# Patient Record
Sex: FEMALE | Race: WHITE | Employment: FULL TIME | URBAN - METROPOLITAN AREA
[De-identification: names, ages, dates, MRNs, and addresses within clinical notes are randomized per-mention and may not be internally consistent; named-entity substitution may affect disease eponyms.]

---

## 2023-12-05 ENCOUNTER — TELEPHONE (OUTPATIENT)
Dept: NEUROLOGY | Age: 43
End: 2023-12-05

## 2024-05-30 ENCOUNTER — HOSPITAL ENCOUNTER (OUTPATIENT)
Dept: MAMMOGRAPHY | Age: 44
Discharge: HOME | End: 2024-05-30
Payer: COMMERCIAL

## 2024-05-30 VITALS — BODY MASS INDEX: 23.49 KG/M2 | HEIGHT: 68 IN | WEIGHT: 155 LBS

## 2024-05-30 DIAGNOSIS — Z12.31 BREAST CANCER SCREENING BY MAMMOGRAM: ICD-10-CM

## 2024-05-30 DIAGNOSIS — E55.9 VITAMIN D DEFICIENCY: ICD-10-CM

## 2024-05-30 LAB — 25(OH)D3 SERPL-MCNC: 23.1 NG/ML (ref 30–100)

## 2024-05-30 PROCEDURE — 77063 BREAST TOMOSYNTHESIS BI: CPT

## 2024-08-12 LAB
CHOLEST SERPL-MCNC: 173 MG/DL (ref 0–200)
GLUCOSE SERPL-MCNC: 90 MG/DL (ref 70–99)
HDLC SERPL-MCNC: 47 MG/DL (ref 40–60)
LDLC SERPL CALC-MCNC: 110 MG/DL (ref 0–100)
TRIGL SERPL-MCNC: 81 MG/DL (ref 0–150)

## 2025-04-02 ENCOUNTER — OFFICE VISIT (OUTPATIENT)
Dept: INTERNAL MEDICINE CLINIC | Facility: CLINIC | Age: 45
End: 2025-04-02
Payer: COMMERCIAL

## 2025-04-02 VITALS
HEIGHT: 68 IN | OXYGEN SATURATION: 96 % | SYSTOLIC BLOOD PRESSURE: 112 MMHG | DIASTOLIC BLOOD PRESSURE: 72 MMHG | BODY MASS INDEX: 22.88 KG/M2 | HEART RATE: 80 BPM | WEIGHT: 151 LBS

## 2025-04-02 DIAGNOSIS — J04.0 REFLUX LARYNGITIS: ICD-10-CM

## 2025-04-02 DIAGNOSIS — K21.9 REFLUX LARYNGITIS: ICD-10-CM

## 2025-04-02 DIAGNOSIS — Z12.11 SCREEN FOR COLON CANCER: ICD-10-CM

## 2025-04-02 DIAGNOSIS — R06.83 SNORING: ICD-10-CM

## 2025-04-02 DIAGNOSIS — R06.5 MOUTH BREATHING: ICD-10-CM

## 2025-04-02 DIAGNOSIS — K21.9 GASTROESOPHAGEAL REFLUX DISEASE, UNSPECIFIED WHETHER ESOPHAGITIS PRESENT: Primary | ICD-10-CM

## 2025-04-02 PROBLEM — N32.81 OAB (OVERACTIVE BLADDER): Status: RESOLVED | Noted: 2022-06-09 | Resolved: 2025-04-02

## 2025-04-02 PROCEDURE — 99214 OFFICE O/P EST MOD 30 MIN: CPT | Performed by: INTERNAL MEDICINE

## 2025-04-02 SDOH — ECONOMIC STABILITY: FOOD INSECURITY: WITHIN THE PAST 12 MONTHS, YOU WORRIED THAT YOUR FOOD WOULD RUN OUT BEFORE YOU GOT MONEY TO BUY MORE.: NEVER TRUE

## 2025-04-02 SDOH — ECONOMIC STABILITY: FOOD INSECURITY: WITHIN THE PAST 12 MONTHS, THE FOOD YOU BOUGHT JUST DIDN'T LAST AND YOU DIDN'T HAVE MONEY TO GET MORE.: NEVER TRUE

## 2025-04-02 ASSESSMENT — ENCOUNTER SYMPTOMS
ABDOMINAL DISTENTION: 1
SHORTNESS OF BREATH: 0
COUGH: 0
NAUSEA: 1
ABDOMINAL PAIN: 1
CHEST TIGHTNESS: 0
CONSTIPATION: 0

## 2025-04-02 ASSESSMENT — PATIENT HEALTH QUESTIONNAIRE - PHQ9
SUM OF ALL RESPONSES TO PHQ QUESTIONS 1-9: 0
2. FEELING DOWN, DEPRESSED OR HOPELESS: NOT AT ALL
1. LITTLE INTEREST OR PLEASURE IN DOING THINGS: NOT AT ALL
SUM OF ALL RESPONSES TO PHQ QUESTIONS 1-9: 0

## 2025-04-02 NOTE — PATIENT INSTRUCTIONS
Records available in care everywhere were reviewed, more than 30 to 40 minutes were spent, reviewing and updating current chart.

## 2025-04-02 NOTE — PROGRESS NOTES
Chief Complaint   Patient presents with    Referral - General        Shyanne Phelps is a 44 y.o. female who presents today for Referral - General     Today she is here to request couple of referrals,  She is currently working at the OR with [employer], reports significant stressors, which she believes has been increasing her gastrointestinal symptoms, she has been seen by ENT who found her she had laryngeal reflux, she has been experiencing more of the acid reflux, nausea, abdominal discomfort, she has been on PPI in the past, but reports side effects, so she is currently taking GI Mix Gourmet that was recommended by her provider, she has been trying to avoid possible triggers, but despite of this she has been feeling symptoms are not getting better,  She is going to be due for screening colonoscopy this year, so she will like to establish with GI    She also is concerned about snoring, she is a mouth breather, she feels tired during the day, would like to have a sleep test,        Wt Readings from Last 3 Encounters:   04/02/25 68.5 kg (151 lb)   05/30/24 70.3 kg (155 lb)   10/24/23 68.9 kg (152 lb)     Vitals:    04/02/25 1227   BP: 112/72   BP Site: Left Upper Arm   Patient Position: Sitting   Pulse: 80   SpO2: 96%   Weight: 68.5 kg (151 lb)   Height: 1.727 m (5' 8\")        Assessment and plan:  1. Gastroesophageal reflux disease, unspecified whether esophagitis present  -     External Referral To Gastroenterology  2. Screen for colon cancer  -     External Referral To Gastroenterology  3. Reflux laryngitis  -     External Referral To Gastroenterology  4. Mouth breathing  -     [facility] [place] 2[place] Lab  5. Snoring  -     [facility] [place]note 2[place] Sleep Lab  Referrals for gastroenterology will be placed for possible endoscopy and colonoscopy to rule out Helicobacter pylori, hiatal hernias, esophagitis, among other possible diagnoses, she will continue with natural products, may try

## 2025-04-03 LAB
CHOLEST SERPL-MCNC: 165 MG/DL (ref 0–200)
GLUCOSE SERPL-MCNC: 78 MG/DL (ref 70–99)
HDLC SERPL-MCNC: 45 MG/DL (ref 40–60)
LDLC SERPL CALC-MCNC: 104 MG/DL (ref 0–100)
TRIGL SERPL-MCNC: 79 MG/DL (ref 0–150)

## 2025-05-11 ENCOUNTER — PATIENT MESSAGE (OUTPATIENT)
Dept: INTERNAL MEDICINE CLINIC | Facility: CLINIC | Age: 45
End: 2025-05-11

## 2025-05-11 DIAGNOSIS — J32.9 CHRONIC CONGESTION OF PARANASAL SINUS: Primary | ICD-10-CM

## 2025-07-25 ENCOUNTER — HOSPITAL ENCOUNTER (OUTPATIENT)
Dept: SLEEP MEDICINE | Age: 45
Discharge: HOME | End: 2025-07-28
Payer: COMMERCIAL

## 2025-07-25 PROCEDURE — 95810 POLYSOM 6/> YRS 4/> PARAM: CPT

## 2025-08-13 ENCOUNTER — OFFICE VISIT (OUTPATIENT)
Dept: ENT CLINIC | Age: 45
End: 2025-08-13
Payer: COMMERCIAL

## 2025-08-13 VITALS
RESPIRATION RATE: 10 BRPM | HEIGHT: 68 IN | SYSTOLIC BLOOD PRESSURE: 110 MMHG | WEIGHT: 151.01 LBS | DIASTOLIC BLOOD PRESSURE: 68 MMHG | BODY MASS INDEX: 22.89 KG/M2

## 2025-08-13 DIAGNOSIS — J34.2 DEVIATED SEPTUM: Primary | ICD-10-CM

## 2025-08-13 PROCEDURE — 99204 OFFICE O/P NEW MOD 45 MIN: CPT | Performed by: OTOLARYNGOLOGY

## 2025-08-13 ASSESSMENT — ENCOUNTER SYMPTOMS
SINUS PRESSURE: 1
RESPIRATORY NEGATIVE: 1
SINUS PAIN: 1
GASTROINTESTINAL NEGATIVE: 1
EYES NEGATIVE: 1
ALLERGIC/IMMUNOLOGIC NEGATIVE: 1

## 2025-08-15 DIAGNOSIS — J34.2 DEVIATED SEPTUM: Primary | ICD-10-CM

## 2025-08-15 DIAGNOSIS — G89.18 POST-OPERATIVE PAIN: ICD-10-CM

## 2025-08-15 RX ORDER — ONDANSETRON 8 MG/1
8 TABLET, ORALLY DISINTEGRATING ORAL EVERY 8 HOURS PRN
Qty: 15 TABLET | Refills: 0 | Status: SHIPPED | OUTPATIENT
Start: 2025-08-15 | End: 2025-08-20

## 2025-08-15 RX ORDER — HYDROCODONE BITARTRATE AND ACETAMINOPHEN 5; 325 MG/1; MG/1
1 TABLET ORAL EVERY 6 HOURS PRN
Qty: 20 TABLET | Refills: 0 | Status: SHIPPED | OUTPATIENT
Start: 2025-08-15 | End: 2025-08-20

## 2025-08-15 RX ORDER — CEPHALEXIN 500 MG/1
500 CAPSULE ORAL 4 TIMES DAILY
Qty: 28 CAPSULE | Refills: 0 | Status: SHIPPED | OUTPATIENT
Start: 2025-08-15 | End: 2025-08-22

## 2025-08-21 ENCOUNTER — OUTSIDE SERVICES (OUTPATIENT)
Dept: ENT CLINIC | Age: 45
End: 2025-08-21

## 2025-08-21 DIAGNOSIS — J34.2 DEVIATED SEPTUM: Primary | ICD-10-CM

## 2025-08-21 PROCEDURE — 30520 REPAIR OF NASAL SEPTUM: CPT | Performed by: OTOLARYNGOLOGY

## 2025-08-26 ENCOUNTER — OFFICE VISIT (OUTPATIENT)
Dept: ENT CLINIC | Age: 45
End: 2025-08-26

## 2025-08-26 VITALS — WEIGHT: 151 LBS | HEIGHT: 68 IN | BODY MASS INDEX: 22.88 KG/M2

## 2025-08-26 DIAGNOSIS — Z98.890 S/P NASAL SEPTOPLASTY: Primary | ICD-10-CM

## 2025-08-26 PROCEDURE — 99024 POSTOP FOLLOW-UP VISIT: CPT | Performed by: OTOLARYNGOLOGY

## 2025-08-29 ENCOUNTER — TELEPHONE (OUTPATIENT)
Dept: ENT CLINIC | Age: 45
End: 2025-08-29

## 2025-08-29 RX ORDER — SULFAMETHOXAZOLE AND TRIMETHOPRIM 800; 160 MG/1; MG/1
1 TABLET ORAL 2 TIMES DAILY
Qty: 14 TABLET | Refills: 0 | Status: SHIPPED | OUTPATIENT
Start: 2025-08-29 | End: 2025-09-05

## 2025-08-29 RX ORDER — SULFAMETHOXAZOLE AND TRIMETHOPRIM 800; 160 MG/1; MG/1
1 TABLET ORAL 2 TIMES DAILY
Qty: 14 TABLET | Refills: 0 | Status: SHIPPED
Start: 2025-08-29 | End: 2025-08-29 | Stop reason: SDUPTHER

## 2025-09-04 ENCOUNTER — HOSPITAL ENCOUNTER (OUTPATIENT)
Dept: MAMMOGRAPHY | Age: 45
Discharge: HOME | End: 2025-09-04
Attending: INTERNAL MEDICINE
Payer: COMMERCIAL

## 2025-09-04 VITALS — HEIGHT: 68 IN | BODY MASS INDEX: 22.43 KG/M2 | WEIGHT: 148 LBS

## 2025-09-04 DIAGNOSIS — Z12.31 ENCOUNTER FOR SCREENING MAMMOGRAM FOR BREAST CANCER: ICD-10-CM

## 2025-09-04 PROCEDURE — 77063 BREAST TOMOSYNTHESIS BI: CPT
